# Patient Record
Sex: FEMALE | Race: ASIAN
[De-identification: names, ages, dates, MRNs, and addresses within clinical notes are randomized per-mention and may not be internally consistent; named-entity substitution may affect disease eponyms.]

---

## 2019-07-21 ENCOUNTER — HOSPITAL ENCOUNTER (OUTPATIENT)
Dept: HOSPITAL 92 - SCSER | Age: 39
Setting detail: OBSERVATION
LOS: 1 days | Discharge: HOME | End: 2019-07-22
Attending: SURGERY | Admitting: SURGERY
Payer: COMMERCIAL

## 2019-07-21 VITALS — BODY MASS INDEX: 34.3 KG/M2

## 2019-07-21 DIAGNOSIS — Z79.4: ICD-10-CM

## 2019-07-21 DIAGNOSIS — R94.5: ICD-10-CM

## 2019-07-21 DIAGNOSIS — K82.8: ICD-10-CM

## 2019-07-21 DIAGNOSIS — Z79.899: ICD-10-CM

## 2019-07-21 DIAGNOSIS — K83.8: ICD-10-CM

## 2019-07-21 DIAGNOSIS — E66.01: ICD-10-CM

## 2019-07-21 DIAGNOSIS — E11.9: ICD-10-CM

## 2019-07-21 DIAGNOSIS — K80.66: Primary | ICD-10-CM

## 2019-07-21 LAB
ALBUMIN SERPL BCG-MCNC: 3.9 G/DL (ref 3.5–5)
ALP SERPL-CCNC: 104 U/L (ref 40–150)
ALT SERPL W P-5'-P-CCNC: 15 U/L (ref 8–55)
ANION GAP SERPL CALC-SCNC: 15 MMOL/L (ref 10–20)
ANISOCYTOSIS BLD QL SMEAR: (no result) (100X)
AST SERPL-CCNC: 21 U/L (ref 5–34)
BACTERIA UR QL AUTO: (no result) HPF
BASOPHILS # BLD AUTO: 0.1 THOU/UL (ref 0–0.2)
BASOPHILS NFR BLD AUTO: 0.7 % (ref 0–1)
BILIRUB SERPL-MCNC: 0.6 MG/DL (ref 0.2–1.2)
BUN SERPL-MCNC: 11 MG/DL (ref 7–18.7)
CALCIUM SERPL-MCNC: 9.8 MG/DL (ref 7.8–10.44)
CHLORIDE SERPL-SCNC: 105 MMOL/L (ref 98–107)
CK SERPL-CCNC: 58 U/L (ref 29–168)
CO2 SERPL-SCNC: 25 MMOL/L (ref 22–29)
CREAT CL PREDICTED SERPL C-G-VRATE: 0 ML/MIN (ref 70–130)
EOSINOPHIL # BLD AUTO: 0.1 THOU/UL (ref 0–0.7)
EOSINOPHIL NFR BLD AUTO: 2 % (ref 0–10)
GIANT PLATELETS BLD QL SMEAR: SLIGHT
GLOBULIN SER CALC-MCNC: 3.9 G/DL (ref 2.4–3.5)
GLUCOSE SERPL-MCNC: 133 MG/DL (ref 70–105)
HGB BLD-MCNC: 11.5 G/DL (ref 12–16)
LYMPHOCYTES # BLD: 2.2 THOU/UL (ref 1.2–3.4)
LYMPHOCYTES NFR BLD AUTO: 31.9 % (ref 21–51)
MACROCYTES BLD QL SMEAR: (no result) (100X)
MCH RBC QN AUTO: 26.5 PG (ref 27–31)
MCV RBC AUTO: 83.4 FL (ref 78–98)
MDIFF COMPLETE?: YES
MICROCYTES BLD QL SMEAR: (no result) (100X)
MONOCYTES # BLD AUTO: 0.3 THOU/UL (ref 0.11–0.59)
MONOCYTES NFR BLD AUTO: 4.7 % (ref 0–10)
NEUTROPHILS # BLD AUTO: 4.1 THOU/UL (ref 1.4–6.5)
NEUTROPHILS NFR BLD AUTO: 60.7 % (ref 42–75)
PLATELET # BLD AUTO: 95 THOU/UL (ref 130–400)
POTASSIUM SERPL-SCNC: 3.7 MMOL/L (ref 3.5–5.1)
PREGU CONTROL BACKGROUND?: (no result)
PREGU CONTROL BAR APPEAR?: YES
PROT UR STRIP.AUTO-MCNC: 30 MG/DL
RBC # BLD AUTO: 4.35 MILL/UL (ref 4.2–5.4)
RBC UR QL AUTO: (no result) HPF (ref 0–3)
SODIUM SERPL-SCNC: 141 MMOL/L (ref 136–145)
STOMATOCYTES BLD QL SMEAR: (no result) (100X)
WBC # BLD AUTO: 6.8 THOU/UL (ref 4.8–10.8)
WBC UR QL AUTO: (no result) HPF (ref 0–3)

## 2019-07-21 PROCEDURE — 96366 THER/PROPH/DIAG IV INF ADDON: CPT

## 2019-07-21 PROCEDURE — 85025 COMPLETE CBC W/AUTO DIFF WBC: CPT

## 2019-07-21 PROCEDURE — 88304 TISSUE EXAM BY PATHOLOGIST: CPT

## 2019-07-21 PROCEDURE — S0028 INJECTION, FAMOTIDINE, 20 MG: HCPCS

## 2019-07-21 PROCEDURE — 82550 ASSAY OF CK (CPK): CPT

## 2019-07-21 PROCEDURE — 80053 COMPREHEN METABOLIC PANEL: CPT

## 2019-07-21 PROCEDURE — 84484 ASSAY OF TROPONIN QUANT: CPT

## 2019-07-21 PROCEDURE — 96361 HYDRATE IV INFUSION ADD-ON: CPT

## 2019-07-21 PROCEDURE — 81025 URINE PREGNANCY TEST: CPT

## 2019-07-21 PROCEDURE — 36416 COLLJ CAPILLARY BLOOD SPEC: CPT

## 2019-07-21 PROCEDURE — 93005 ELECTROCARDIOGRAM TRACING: CPT

## 2019-07-21 PROCEDURE — 71046 X-RAY EXAM CHEST 2 VIEWS: CPT

## 2019-07-21 PROCEDURE — 96365 THER/PROPH/DIAG IV INF INIT: CPT

## 2019-07-21 PROCEDURE — G0378 HOSPITAL OBSERVATION PER HR: HCPCS

## 2019-07-21 PROCEDURE — 81003 URINALYSIS AUTO W/O SCOPE: CPT

## 2019-07-21 PROCEDURE — 96374 THER/PROPH/DIAG INJ IV PUSH: CPT

## 2019-07-21 PROCEDURE — 81015 MICROSCOPIC EXAM OF URINE: CPT

## 2019-07-21 PROCEDURE — 96375 TX/PRO/DX INJ NEW DRUG ADDON: CPT

## 2019-07-21 PROCEDURE — 76705 ECHO EXAM OF ABDOMEN: CPT

## 2019-07-21 PROCEDURE — 47532 INJECTION FOR CHOLANGIOGRAM: CPT

## 2019-07-21 NOTE — ULT
Right upper quadrant ultrasound:

7/21/2019



COMPARISON: None



HISTORY: Right upper quadrant pain



TECHNIQUE: Multiplanar grayscale sonographic imaging of the right upper quadrant provided.



FINDINGS: The imaged pancreas appears grossly unremarkable. The distal body and tail are obscured by 
bowel gas. Left lobe of liver partially obscured by bowel gas as well. Imaged hepatic parenchyma

demonstrates no focal lesion.



Portions of the right lobe of the liver are obscured by bowel gas as well.



No gallbladder wall thickening or pericholecystic fluid.



Right kidney measures 11.5 cm in craniocaudal dimension and demonstrates no stone, hydronephrosis, or
 mass.



There are a few tiny stones within the gallbladder lumen measuring in the 7 mm range.



The sonographer reports a negative Richards's sign.



The common bile duct is dilated, measuring just over 9 mm.



IMPRESSION: Cholelithiasis with dilated common bile duct. Findings may signify biliary obstruction on
 the basis of nonvisualized choledocholithiasis. Correlation with LFTs and consideration for GI

consultation is advised.



No sonographic evidence of acute cholecystitis.



Reported By: Isra Biggs 

Electronically Signed:  7/21/2019 4:18 PM

## 2019-07-21 NOTE — RAD
2 views chest:

7/21/2019



COMPARISON: None



HISTORY: Epigastric pain, right upper quadrant pain, nausea



FINDINGS: Lungs are clear. Heart and mediastinal contours are unremarkable.



IMPRESSION: No acute findings.



Reported By: Isra Biggs 

Electronically Signed:  7/21/2019 4:20 PM

## 2019-07-22 VITALS — SYSTOLIC BLOOD PRESSURE: 136 MMHG | DIASTOLIC BLOOD PRESSURE: 76 MMHG

## 2019-07-22 VITALS — TEMPERATURE: 97.6 F

## 2019-07-22 PROCEDURE — BF121ZZ FLUOROSCOPY OF GALLBLADDER USING LOW OSMOLAR CONTRAST: ICD-10-PCS | Performed by: SURGERY

## 2019-07-22 PROCEDURE — 0FT44ZZ RESECTION OF GALLBLADDER, PERCUTANEOUS ENDOSCOPIC APPROACH: ICD-10-PCS | Performed by: SURGERY

## 2019-07-22 NOTE — OP
DATE OF PROCEDURE:  07/22/2019



PREOPERATIVE DIAGNOSIS:  Acute cholecystitis with elevated liver function.



PROCEDURE PERFORMED:  Laparoscopic cholecystectomy with intraoperative cholangiogram.



INDICATIONS:  This is a 38-year-old female, who is 2 months' postpartum, who has

been having severe right upper quadrant pain, low-grade fever, elevated liver

functions, and an ultrasound showing gallstones. 



FINDINGS:  Cholangiogram showed no filling defects.  Free flow into the duodenum.

She had quite a bit of edema in the wall of the gallbladder, it was very distended. 



DESCRIPTION OF PROCEDURE:  After informed consent was obtained, the patient was

taken to the operating room, given general endotracheal anesthesia, placed in the

supine position.  Abdomen was prepped and draped in usual fashion.  Local anesthesia

was infiltrated subcutaneously and deep and a 12 mm incision was performed

subumbilical.  Subcu divided sharply.  The fascia was grasped and 2 stay sutures of

0 Vicryl placed through each side of midline.  Midline was incised.  Digital

palpation revealed no local adhesions.  A blunt 12 mm trocar was inserted.

Pneumoperitoneum was created to a pressure of 15 mmHg.  A 0 degree laparoscope was

inserted under direct vision.  Three 5-mm ports were placed subcostally.  The

gallbladder was very distended.  It was punctured with an aspirating needle and

emptied of bile approximately 120 mL.  Then, the gallbladder was grasped and

advanced superiorly.  The peritoneum was dissected distally to expose the cystic

duct artery in critical view.  The clip was placed at the base of the gallbladder

and an Arrow cholangiocatheter inserted.  Intraoperative cholangiogram showed free

flow into the duodenum.  No filling defects.  The duct was triply ligated and

divided.  The artery was triply ligated and divided.  The gallbladder removed from

its fossa utilizing electrocautery, removed from the abdomen in an endosac through

the umbilical port.  Hemostasis was assured. 

Trocars and retractors were removed.  The fascia was closed with interrupted 0

Vicryl suture.  The skin was closed with interrupted 4-0 Rapide.  Dermabond applied.

 The 

patient tolerated the procedure well and was transferred to Recovery in good

condition.  Sponge and needle count verified correct x2. 







Job ID:  [id number]

## 2025-01-28 NOTE — HP
CHIEF COMPLAINT:  Right upper quadrant abdominal pain.



HISTORY OF PRESENT ILLNESS:  This is a 38-year-old female with a 24-hour history of

mid to right upper quadrant abdominal pain radiating to back associated with nausea

and vomiting.  No fevers or chills.  No change in bowel habits.  Ultrasound showed

cholelithiasis and an enlarged common bile duct.  She recently is postpartum in the

end of March. 



PAST MEDICAL HISTORY:  She is an insulin-dependent diabetic.  She has morbid obesity.



PAST SURGICAL HISTORY:   section x2.



MEDICATIONS:  

1. NovoLog.

2. Vitamin D.

3. Prenatal vitamins.

4. Folate.



ALLERGIES:  NO KNOWN DRUG ALLERGIES.



SOCIAL HISTORY:  She is , homemaker.  No tobacco or alcohol.



FAMILY HISTORY:  Liver cancer and diabetes.



PHYSICAL EXAMINATION:

VITAL SIGNS:  Temperature 97.8, pulse 68, and blood pressure 136/83. 

GENERAL:  Well-developed, well-nourished female, in no apparent distress. 

HEENT:  Unremarkable. 

LUNGS:  Clear. 

HEART:  Regular rate and rhythm. 

ABDOMEN:  Soft, nondistended, and nontender. 

EXTREMITIES:  Unremarkable.



LABORATORY DATA:  White count 6.8, H and H of 11 and 36, and platelet count of 95.

Electrolytes, glucose is 120.  LFTs are normal. 



ASSESSMENT:  Severe biliary colic with enlarged common duct.



PLAN:  Laparoscopic cholecystectomy with cholangiogram.



CONSENT:  I have discussed planned procedure as well as risk of bleeding, infection,

injury to bile duct, injury to bowel, need to open, she understands and gives

informed consent. 







Job ID:  [id number] Prior to immunization administration, verified patients identity using patient s name and date of birth. Please see Immunization Activity for additional information.     Screening Questionnaire for Adult Immunization    Are you sick today?   No   Do you have allergies to medications, food, a vaccine component or latex?   Don't Know (NKDA)    Have you ever had a serious reaction after receiving a vaccination?   No   Do you have a long-term health problem with heart, lung, kidney, or metabolic disease (e.g., diabetes), asthma, a blood disorder, no spleen, complement component deficiency, a cochlear implant, or a spinal fluid leak?  Are you on long-term aspirin therapy?   No   Do you have cancer, leukemia, HIV/AIDS, or any other immune system problem?   No   Do you have a parent, brother, or sister with an immune system problem?   No   In the past 3 months, have you taken medications that affect  your immune system, such as prednisone, other steroids, or anticancer drugs; drugs for the treatment of rheumatoid arthritis, Crohn s disease, or psoriasis; or have you had radiation treatments?   No   Have you had a seizure, or a brain or other nervous system problem?   No   During the past year, have you received a transfusion of blood or blood    products, or been given immune (gamma) globulin or antiviral drug?   No   For women: Are you pregnant or is there a chance you could become       pregnant during the next month?   No   Have you received any vaccinations in the past 4 weeks?   No     Immunization questionnaire answers were all negative.      Patient instructed to remain in clinic for 15 minutes afterwards, and to report any adverse reactions.     Screening performed by Janeth Bryant CMA on 1/28/2025 at 11:30 AM.